# Patient Record
Sex: MALE | Race: WHITE
[De-identification: names, ages, dates, MRNs, and addresses within clinical notes are randomized per-mention and may not be internally consistent; named-entity substitution may affect disease eponyms.]

---

## 2017-12-29 ENCOUNTER — HOSPITAL ENCOUNTER (OUTPATIENT)
Dept: HOSPITAL 45 - C.LABMFLN | Age: 67
Discharge: HOME | End: 2017-12-29
Attending: FAMILY MEDICINE
Payer: COMMERCIAL

## 2017-12-29 DIAGNOSIS — R35.0: Primary | ICD-10-CM

## 2018-01-02 ENCOUNTER — HOSPITAL ENCOUNTER (OUTPATIENT)
Dept: HOSPITAL 45 - C.ULTRBC | Age: 68
Discharge: HOME | End: 2018-01-02
Attending: UROLOGY
Payer: COMMERCIAL

## 2018-01-02 DIAGNOSIS — N50.89: Primary | ICD-10-CM

## 2018-01-02 NOTE — DIAGNOSTIC IMAGING REPORT
(TESTICULAR) SCROTUM-CONT



HISTORY: Pain. Edema.  SWELLING



COMPARISON:  None.



FINDINGS:



Right testis: Maximum dimension 4.3 cm. 1.2 mm central calcification. 3.5 cm

right epididymal cyst.



Left testis:  Maximum dimension 4.5 cm. Uniform echogenicity 5 mm epididymal

cyst.



IMPRESSION:  



1. Unremarkable testicular ultrasound.





2. Normal vascular flow is confirmed bilaterally.

3. Bilateral epididymal cysts measuring 3.5 cm on the right and 0.5 cm on the

left. 









The above report was generated using voice recognition software.  It may contain

grammatical, syntax or spelling errors.







Electronically signed by:  Gray Huff M.D.

1/2/2018 3:39 PM



Dictated Date/Time:  1/2/2018 3:37 PM

## 2018-01-16 ENCOUNTER — HOSPITAL ENCOUNTER (OUTPATIENT)
Dept: HOSPITAL 45 - C.LABMFLN | Age: 68
Discharge: HOME | End: 2018-01-16
Attending: FAMILY MEDICINE
Payer: COMMERCIAL

## 2018-01-16 DIAGNOSIS — R10.31: Primary | ICD-10-CM

## 2018-01-16 DIAGNOSIS — R97.20: ICD-10-CM

## 2018-01-16 LAB
ALBUMIN SERPL-MCNC: 3.9 GM/DL (ref 3.4–5)
ALP SERPL-CCNC: 83 U/L (ref 45–117)
ALT SERPL-CCNC: 32 U/L (ref 12–78)
AST SERPL-CCNC: 21 U/L (ref 15–37)
BASOPHILS # BLD: 0.03 K/UL (ref 0–0.2)
BASOPHILS NFR BLD: 0.3 %
BUN SERPL-MCNC: 15 MG/DL (ref 7–18)
CALCIUM SERPL-MCNC: 9.1 MG/DL (ref 8.5–10.1)
CO2 SERPL-SCNC: 25 MMOL/L (ref 21–32)
CREAT SERPL-MCNC: 0.87 MG/DL (ref 0.6–1.4)
EOS ABS #: 0.23 K/UL (ref 0–0.5)
EOSINOPHIL NFR BLD AUTO: 221 K/UL (ref 130–400)
GLUCOSE SERPL-MCNC: 103 MG/DL (ref 70–99)
HCT VFR BLD CALC: 44.8 % (ref 42–52)
HGB BLD-MCNC: 15.6 G/DL (ref 14–18)
IG#: 0.03 K/UL (ref 0–0.02)
IMM GRANULOCYTES NFR BLD AUTO: 16.6 %
LYMPHOCYTES # BLD: 1.76 K/UL (ref 1.2–3.4)
MCH RBC QN AUTO: 35.1 PG (ref 25–34)
MCHC RBC AUTO-ENTMCNC: 34.8 G/DL (ref 32–36)
MCV RBC AUTO: 100.9 FL (ref 80–100)
MONO ABS #: 0.59 K/UL (ref 0.11–0.59)
MONOCYTES NFR BLD: 5.6 %
NEUT ABS #: 7.95 K/UL (ref 1.4–6.5)
NEUTROPHILS # BLD AUTO: 2.2 %
NEUTROPHILS NFR BLD AUTO: 75 %
PMV BLD AUTO: 10.1 FL (ref 7.4–10.4)
POTASSIUM SERPL-SCNC: 3.8 MMOL/L (ref 3.5–5.1)
PROT SERPL-MCNC: 6.6 GM/DL (ref 6.4–8.2)
RED CELL DISTRIBUTION WIDTH CV: 12.9 % (ref 11.5–14.5)
RED CELL DISTRIBUTION WIDTH SD: 47.4 FL (ref 36.4–46.3)
SODIUM SERPL-SCNC: 138 MMOL/L (ref 136–145)
WBC # BLD AUTO: 10.59 K/UL (ref 4.8–10.8)

## 2018-01-22 ENCOUNTER — HOSPITAL ENCOUNTER (OUTPATIENT)
Dept: HOSPITAL 45 - C.MRIBC | Age: 68
Discharge: HOME | End: 2018-01-22
Attending: UROLOGY
Payer: COMMERCIAL

## 2018-01-22 DIAGNOSIS — K57.30: ICD-10-CM

## 2018-01-22 DIAGNOSIS — N40.1: Primary | ICD-10-CM

## 2018-01-22 NOTE — DIAGNOSTIC IMAGING REPORT
PROSTATE MRI COMBO



CLINICAL HISTORY: 67 years-old Male presenting with R97.20 Elevated prostate

specific antigen (PSA)claustrophobic, s. History of prior prostate biopsy years

prior.



TECHNIQUE: Multisequence, multiplanar MR imaging of the prostate was performed

before and after the administration of intravenous contrast. Additional

postprocessing was performed on a separate [employer] workstation by the

radiologist for 3-D volumetric segmentation of the prostate and contouring of

region(s) of interest (KARINA) for targeting. IV contrast: 8.5 mL Gadavist. 



COMPARISON: None.



FINDINGS:

Study is mildly motion degraded.



Prostate: The prostate measures 4.2 x 5.8 x 4.5 cm (DynaCAD prostate boundary

segmentation volume 63.4 mL). Moderate changes of benign prostatic hyperplasia.

Precontrast T1 weighted imaging demonstrates no evidence of intrinsic T1

hyperintensity to suggest hemorrhage. Seminal vesicles normal.



No suspicious lesions identified. Circumscribed hypointense BPH nodule measuring

8 x 6 mm is noted within the transitional zone on image 26 of series 6 without

restricted diffusion or enhancement.



Bladder: Bladder wall thickening likely indicating chronic outlet obstruction.



Bowel: Mild sigmoid diverticulosis. Rectum appears unremarkable.



Peritoneum: No free fluid in the pelvis.



Lymph nodes: No lymphadenopathy in the visualized portion of the pelvis.



Vasculature: Iliac vessels patent.



Abdominal wall: Normal.



Osseous structures: Normal bone marrow signal intensity.



IMPRESSION:

1.  No suspicious lesions identified.

2.  Benign prostatic hyperplasia with evidence of chronic bladder outlet

obstruction.

3. Sigmoid colon diverticulosis.







Electronically signed by:  Teo Frost M.D.

1/22/2018 6:40 PM



Dictated Date/Time:  1/22/2018 3:51 PM

## 2018-03-14 ENCOUNTER — HOSPITAL ENCOUNTER (OUTPATIENT)
Dept: HOSPITAL 45 - C.LABMFLN | Age: 68
Discharge: HOME | End: 2018-03-14
Attending: UROLOGY
Payer: COMMERCIAL

## 2018-03-14 DIAGNOSIS — N43.40: Primary | ICD-10-CM

## 2018-04-17 ENCOUNTER — HOSPITAL ENCOUNTER (OUTPATIENT)
Dept: HOSPITAL 45 - C.LABMFLN | Age: 68
Discharge: HOME | End: 2018-04-17
Attending: FAMILY MEDICINE
Payer: COMMERCIAL

## 2018-04-17 DIAGNOSIS — E78.00: ICD-10-CM

## 2018-04-17 DIAGNOSIS — M10.9: Primary | ICD-10-CM

## 2018-04-17 LAB
KETONES UR QL STRIP: 60 MG/DL
PH UR: 154 MG/DL (ref 0–200)
URATE SERPL-MCNC: 5.6 MG/DL (ref 2.6–7.2)

## 2018-04-23 ENCOUNTER — HOSPITAL ENCOUNTER (OUTPATIENT)
Dept: HOSPITAL 45 - C.ULTR | Age: 68
Discharge: HOME | End: 2018-04-23
Attending: INTERNAL MEDICINE
Payer: COMMERCIAL

## 2018-04-23 DIAGNOSIS — I25.83: ICD-10-CM

## 2018-04-23 DIAGNOSIS — Z95.1: ICD-10-CM

## 2018-04-23 DIAGNOSIS — I25.10: Primary | ICD-10-CM

## 2018-04-23 DIAGNOSIS — I65.23: ICD-10-CM

## 2018-04-23 DIAGNOSIS — I10: ICD-10-CM

## 2018-04-23 NOTE — DIAGNOSTIC IMAGING REPORT
CAROTID DOPPLER NECK ART



HISTORY: Mental status change  CORONARY ARTERY DISEASE, ANGINA, CORONARY ARTERY



COMPARISON: None.



TECHNIQUE: Real-time, grayscale, and color Doppler sonography of the carotid

arteries was performed. Imaging reviewed in the transverse and longitudinal

planes. All measurements were calculated based on NASCET criteria.



FINDINGS:

Antegrade flow is seen in the bilateral vertebral arteries. 

The brachial pressures are hemodynamically similar.

Considerable plaque formation bilaterally



The peak systolic velocity within the right ICA is 206. The right systolic ratio

is 2.0.



The peak systolic velocity within the left ICA is 166. The left systolic ratio

is 2.3.



IMPRESSION:  



1. 60-70% stenosis of the internal carotid arteries bilaterally.

2. High-grade stenosis in the external carotid arteries bilaterally.

3. Normal antegrade flow of the vertebral vessels.





4. Significant plaque formation bilaterally









The above report was generated using voice recognition software.  It may contain

grammatical, syntax or spelling errors.







Electronically signed by:  Gray Huff M.D.

4/23/2018 3:26 PM



Dictated Date/Time:  4/23/2018 3:17 PM

## 2018-05-10 ENCOUNTER — HOSPITAL ENCOUNTER (OUTPATIENT)
Dept: HOSPITAL 45 - C.PATHSPEC | Age: 68
Discharge: HOME | End: 2018-05-10
Attending: UROLOGY
Payer: COMMERCIAL

## 2018-05-10 DIAGNOSIS — R97.20: Primary | ICD-10-CM
